# Patient Record
Sex: FEMALE | Race: WHITE | NOT HISPANIC OR LATINO
[De-identification: names, ages, dates, MRNs, and addresses within clinical notes are randomized per-mention and may not be internally consistent; named-entity substitution may affect disease eponyms.]

---

## 2019-08-09 ENCOUNTER — APPOINTMENT (OUTPATIENT)
Dept: ORTHOPEDIC SURGERY | Facility: CLINIC | Age: 74
End: 2019-08-09
Payer: MEDICARE

## 2019-08-09 DIAGNOSIS — Z72.89 OTHER PROBLEMS RELATED TO LIFESTYLE: ICD-10-CM

## 2019-08-09 DIAGNOSIS — Z82.62 FAMILY HISTORY OF OSTEOPOROSIS: ICD-10-CM

## 2019-08-09 DIAGNOSIS — Z78.9 OTHER SPECIFIED HEALTH STATUS: ICD-10-CM

## 2019-08-09 DIAGNOSIS — S83.242A OTHER TEAR OF MEDIAL MENISCUS, CURRENT INJURY, LEFT KNEE, INITIAL ENCOUNTER: ICD-10-CM

## 2019-08-09 DIAGNOSIS — M17.12 UNILATERAL PRIMARY OSTEOARTHRITIS, LEFT KNEE: ICD-10-CM

## 2019-08-09 DIAGNOSIS — S83.282A OTHER TEAR OF LATERAL MENISCUS, CURRENT INJURY, LEFT KNEE, INITIAL ENCOUNTER: ICD-10-CM

## 2019-08-09 DIAGNOSIS — M25.562 PAIN IN LEFT KNEE: ICD-10-CM

## 2019-08-09 DIAGNOSIS — Z82.61 FAMILY HISTORY OF ARTHRITIS: ICD-10-CM

## 2019-08-09 PROCEDURE — 73564 X-RAY EXAM KNEE 4 OR MORE: CPT | Mod: LT

## 2019-08-09 PROCEDURE — 99203 OFFICE O/P NEW LOW 30 MIN: CPT

## 2019-08-09 PROCEDURE — 73560 X-RAY EXAM OF KNEE 1 OR 2: CPT | Mod: RT

## 2019-08-09 NOTE — PHYSICAL EXAM
[de-identified] : General appearance: well nourished and hydrated, pleasant, alert and oriented x 3, cooperative.\par HEENT: Normocephalic, EOM intact, Nasal septum midline, Oral cavity clear, External auditory canal clear.\par Cardiovascular: no apparent abnormalities, no lower leg edema, no varicosities, pedal pulses are palpable.\par Lymphatics Lymph nodes: none palpated, Lymphedema: not present.\par Neurologic: sensation is normal, no muscle weakness in upper or lower extremities, patella tendon reflexes intact .\par Dermatologic no apparent skin lesions, moist, warm, no rash.\par Spine: cervical spine appears normal and moves freely, thoracic spine appears normal and moves freely, lumbosacral spine appears normal and moves freely.\par Gait: nonantalgic.\par \par Left knee\par Inspection: no effusion or erythema.\par Wounds: none.\par Alignment: normal.\par Palpation: medial and lateral tenderness on palpation.\par ROM active (in degrees): 5-135 with patellofemoral crepitus \par Ligamentous laxity: all ligaments appear stable,, negative ant. drawer test, negative post. drawer test, stable to varus stress test, stable to valgus stress test. negative Lachman's test, negative pivot shift test\par Meniscal Test: negative McMurrays, negative Lilliam.\par Patellofemoral Alignment Test: Q angle-, normal.\par Muscle Test: good quad strength.\par Leg examination: calf is soft and non-tender.\par \par Right knee\par Inspection: no effusion or erythema.\par Wounds: none.\par Alignment: normal.\par Palpation: no specific tenderness on palpation.\par ROM active (in degrees): 0-135\par Ligamentous laxity: all ligaments appear stable,, negative ant. drawer test, negative post. drawer test, stable to varus stress test, stable to valgus stress test. negative Lachman's test, negative pivot shift test\par Meniscal Test: negative McMurrays, negative Lilliam.\par Patellofemoral Alignment Test: Q angle-, normal.\par Muscle Test: good quad strength.\par Leg examination: calf is soft and non-tender.\par \par Left hip\par Inspection: No swelling or ecchymosis.\par Wounds: none.\par Palpation: non-tender.\par Stability: no instability.\par Strength: 5/5 all motor groups.\par ROM: no pain with FROM.\par Leg length: equal.\par \par Right hip\par Inspection: No swelling or ecchymosis.\par Wounds: none.\par Palpation: non-tender.\par Stability: no instability.\par Strength: 5/5 all motor groups.\par ROM: no pain with FROM.\par Leg length: equal.\par \par Left ankle\par Inspection: no erythema noted, no swelling noted.\par Palpation: no pain on palpation .\par ROM: FROM without crepitus.\par Muscle strength: 5/5.\par Stability: no instability noted.\par \par Right ankle\par Inspection: no erythema noted, no swelling noted.\par ROM: FROM without crepitus.\par Palpation: no pain on palpation .\par Muscle strength: 5/5.\par Stability: no instability noted.\par \par Left foot\par Inspection: color, texture and turgor are normal.\par ROM: full range of motion of all joints without pain or crepitus.\par Palpation: no tenderness.\par Stability: no instability noted.\par \par Right foot\par Inspection: color, texture and turgor are normal.\par ROM: full range of motion of all joints without pain or crepitus.\par Palpation: no tenderness.\par Stability: no instability noted. [de-identified] : Left knee xrays, standing AP/Lateral, Merchant, and 45 degree PA standing view, taken at the office today shows normal alignment, mild decreased medial and lateral joint height, patella sits at an appropriate height in a central position with mild joint space maintained, Kellgren and Mateusz grade 2 \par \par Right knee xray merchant view taken at the office today shows the patella in a central position with mild joint space narrowing consistent with patellofemoral arthritis  \par \par MRI Left knee taken 1/15/2019: Films brought in and reviewed, see attached report.\par

## 2019-08-09 NOTE — ADDENDUM
[FreeTextEntry1] : This note was written by Talia Ortiz on 08/09/2019 acting as scribe for Dr. Dakota Martin M.D.\par \par I, Dr. Dakota Martin M.D., have read and attest that all the information, medical decision making and discharge instructions within are true and accurate.\par

## 2019-08-09 NOTE — DISCUSSION/SUMMARY
[de-identified] : Discussed at length the nature of the patients condition. Their left knee symptoms appear secondary to degenerative arthritis and torn medial and lateral meniscus which at this point is minimally symptomatic. We reviewed operative and nonoperative treatment. I think she would benefit from nonoperative treatment at this time. I have suggested PT which is medically necessary, as well as Motrin and Tylenol prn. She declined any injections since she did have a cortisone injection a few months ago. They can continue activities as tolerated. Patient may follow up with x-rays in 2-3 months if she has any residual symptoms.

## 2019-08-09 NOTE — HISTORY OF PRESENT ILLNESS
[de-identified] : 73 year old female presents for initial evaluation of left knee pain for the past 6 months. She reports no inciting event, but does state she twisted her left knee a few weeks ago which exacerbated her pain. She locates her pain in the lateral aspect of the joint, which is dull in nature. She endorses locking, clicking, and swelling. She can walk 3-5 blocks without a cane, walker, or brace and negotiates stairs normally. She has been using Tylenol and Motrin prn with limited relief. Patient did see Dr. Caballero, who obtained an MRI of her left knee and sent her to PT which she feels did improve her symptoms.  She also received a cortisone injection, which did provide months of relief. Today, she would like to discuss her treatment options with Dr. Martin as her pain has returned.

## 2022-12-19 ENCOUNTER — APPOINTMENT (OUTPATIENT)
Dept: ORTHOPEDIC SURGERY | Facility: CLINIC | Age: 77
End: 2022-12-19

## 2022-12-19 ENCOUNTER — NON-APPOINTMENT (OUTPATIENT)
Age: 77
End: 2022-12-19

## 2022-12-19 PROCEDURE — 72040 X-RAY EXAM NECK SPINE 2-3 VW: CPT

## 2022-12-19 PROCEDURE — 99204 OFFICE O/P NEW MOD 45 MIN: CPT

## 2022-12-19 PROCEDURE — 73030 X-RAY EXAM OF SHOULDER: CPT | Mod: LT

## 2023-02-02 ENCOUNTER — APPOINTMENT (OUTPATIENT)
Dept: ORTHOPEDIC SURGERY | Facility: CLINIC | Age: 78
End: 2023-02-02
Payer: MEDICARE

## 2023-02-02 DIAGNOSIS — M75.42 IMPINGEMENT SYNDROME OF LEFT SHOULDER: ICD-10-CM

## 2023-02-02 PROCEDURE — 99213 OFFICE O/P EST LOW 20 MIN: CPT

## 2023-10-18 ENCOUNTER — APPOINTMENT (OUTPATIENT)
Dept: ORTHOPEDIC SURGERY | Facility: CLINIC | Age: 78
End: 2023-10-18
Payer: MEDICARE

## 2023-10-18 ENCOUNTER — NON-APPOINTMENT (OUTPATIENT)
Age: 78
End: 2023-10-18

## 2023-10-18 PROCEDURE — 99213 OFFICE O/P EST LOW 20 MIN: CPT | Mod: 25

## 2023-10-18 PROCEDURE — 20550 NJX 1 TENDON SHEATH/LIGAMENT: CPT | Mod: RT

## 2023-10-18 RX ADMIN — LIDOCAINE HYDROCHLORIDE 0.5 %: 10 INJECTION, SOLUTION INFILTRATION; PERINEURAL at 00:00

## 2023-10-18 RX ADMIN — BETAMETHASONE ACETATE AND BETAMETHASONE SODIUM PHOSPHATE 1 MG/ML: 3; 3 INJECTION, SUSPENSION INTRA-ARTICULAR; INTRALESIONAL; INTRAMUSCULAR; SOFT TISSUE at 00:00

## 2023-10-27 RX ORDER — LIDOCAINE HYDROCHLORIDE 10 MG/ML
1 INJECTION, SOLUTION INFILTRATION; PERINEURAL
Refills: 0 | Status: COMPLETED | OUTPATIENT
Start: 2023-10-18

## 2023-10-27 RX ORDER — BETAMETHA AC,SOD PHOS/WATER/PF 6 MG/ML
6 (3-3) VIAL (ML) INJECTION
Qty: 1 | Refills: 0 | Status: COMPLETED | OUTPATIENT
Start: 2023-10-18

## 2023-12-11 ENCOUNTER — APPOINTMENT (OUTPATIENT)
Dept: ORTHOPEDIC SURGERY | Facility: CLINIC | Age: 78
End: 2023-12-11
Payer: MEDICARE

## 2023-12-11 VITALS — BODY MASS INDEX: 23.32 KG/M2 | HEIGHT: 65 IN | WEIGHT: 140 LBS

## 2023-12-11 PROCEDURE — 73030 X-RAY EXAM OF SHOULDER: CPT | Mod: LT

## 2023-12-11 PROCEDURE — 99214 OFFICE O/P EST MOD 30 MIN: CPT

## 2023-12-11 PROCEDURE — 72040 X-RAY EXAM NECK SPINE 2-3 VW: CPT

## 2023-12-30 ENCOUNTER — APPOINTMENT (OUTPATIENT)
Dept: MRI IMAGING | Facility: IMAGING CENTER | Age: 78
End: 2023-12-30
Payer: MEDICARE

## 2023-12-30 ENCOUNTER — OUTPATIENT (OUTPATIENT)
Dept: OUTPATIENT SERVICES | Facility: HOSPITAL | Age: 78
LOS: 1 days | End: 2023-12-30
Payer: MEDICARE

## 2023-12-30 DIAGNOSIS — M19.012 PRIMARY OSTEOARTHRITIS, LEFT SHOULDER: ICD-10-CM

## 2023-12-30 PROCEDURE — 73221 MRI JOINT UPR EXTREM W/O DYE: CPT

## 2023-12-30 PROCEDURE — 73221 MRI JOINT UPR EXTREM W/O DYE: CPT | Mod: 26,LT,MH

## 2024-01-08 ENCOUNTER — NON-APPOINTMENT (OUTPATIENT)
Age: 79
End: 2024-01-08

## 2024-02-22 ENCOUNTER — APPOINTMENT (OUTPATIENT)
Dept: ORTHOPEDIC SURGERY | Facility: CLINIC | Age: 79
End: 2024-02-22
Payer: MEDICARE

## 2024-02-22 PROCEDURE — 20611 DRAIN/INJ JOINT/BURSA W/US: CPT | Mod: LT

## 2024-02-22 PROCEDURE — 99214 OFFICE O/P EST MOD 30 MIN: CPT | Mod: 25

## 2024-02-22 NOTE — PHYSICAL EXAM
[de-identified] : Constitutional o Appearance : well-nourished, well developed, alert, in no acute distress  Head and Face o Head :  Inspection : atraumatic, normocephalic o Face :  Inspection : no visible rash or discoloration Respiratory o Respiratory Effort: breathing unlabored  Neurologic o Sensation : Normal sensation  Psychiatric o Mood and Affect: mood normal, affect appropriate  Lymphatic o Additional Nodes : No palpable lymph nodes present   Cervical Spine o Inspection/Palpation : left and right paracervical tenderness   Inspection : alignment midline, normal degree of lordosis present  Skin : normal appearance, no masses or tenderness, trachea midline  Palpation : left paracervical and no right or left parascapular,  o Range of Motion :  o Tests: Negative Spurling's test   Right Upper Extremity o Right Shoulder :  Inspection/Palpation : no tenderness, no swelling or deformities  Range of Motion : full ROM, no crepitance  Strength : forward elevation 5/5, IR 5/5, ER 5/5, ER at 90 of abduction 5/5, supraspinatus 5/5, adduction 5/5, abduction 5/5, biceps/triceps 5/5,  5/5   Stability : no joint instability on provocative testing   Tests: Bustos negative, Neer test negative, Nataliia test negative, drop arm test negative, Hopkins's test negative   Left Upper Extremity o Left Shoulder :  Inspection/Palpation : anterior capsular  tenderness, mild AC joint tenderness, no swelling or deformities  Range of Motion : full ER with mild discomfort, limited discomfort with IR,  pain with IR, no crepitance  Strength : forward elevation 4-/5, IR 4-/5, ER 4-/5, ER at 90 of abduction 5/5, supraspinatus5/5, adduction 5/5, abduction 5/5, biceps/triceps 5/5,  5/5   Stability : no joint instability on provocative testing  Tests: Bustos neg, Neer test negative, Nataliia test negative, drop arm test negative, Hopkins's test negative, cross chest maneuvers cause discomfort   Gait and Station: Gait: gait normal, no significant extremity swelling or lymphedema, good proprioception and balance  o Shoulder: Indication- left shoulder arthritis, Anatomic location left intra-articular joint, Spray-area was sterilized with Betadine and alcohol and anesthetized with Ethyl Chloride , Needle used - 22G, Medications given- 5cc's lidocaine, 0.5cc's kenalog, 0.5cc's dexamethasone. The patient tolerated the procedure well. This was done under Ultrasound guidance.

## 2024-02-22 NOTE — ADDENDUM
[FreeTextEntry1] : I, ARELIS JOHNSON, acted solely as a scribe for Dr. Yan Bond on this date 02/22/2024.  All medical record entries made by the Scribe were at my, Dr. Yan Bond, direction and personally dictated by me on 02/22/2024. I have reviewed the chart and agree that the record accurately reflects my personal performance of the history, physical exam, assessment and plan. I have also personally directed, reviewed, and agreed with the chart.

## 2024-02-22 NOTE — DISCUSSION/SUMMARY
[de-identified] : I went over the pathophysiology of the patient's symptoms in great detail with the patient. I went over the patient's MRI results with them in great detail and used models to aid in my explanation. The series of 3 Visco supplementation was discussed as a solution to the patient's symptoms and a booklet with information was provided. The patient elected to receive a cortisone injection into her left shoulder today, and tolerated it well. I instructed the patient on ROM exercises, and told them to take it easy. The use of ice and rest was reviewed with the patient. The patient may resume activities tomorrow.   All of their questions were answered. They understand and consent to the plan.   FU in 6 weeks.

## 2024-02-22 NOTE — HISTORY OF PRESENT ILLNESS
[de-identified] : 77-year-old RHD female presents for a left shoulder MRI review today 2/22/2024. I went over the patient's MRI results with them in great detail and used models to aid in my explanation. She states she is a chronic insomniac. She states she cannot find a comfortable position for her left shoulder to sleep in.  Patient denies that she has any numbness or tingling. She states she is also having neck discomfort.   MRI [facility] LEFT SHOULDER revealed: done on 12/30/2023 IMPRESSION: 1.  Severe glenohumeral joint osteoarthritis with large effusion and synovitis.  2.  Moderate tendinosis of supraspinatus and infraspinatus tendons with mildly retracted high-grade partial-thickness tearing of interstitial and bursal sided fibers at the supraspinatus/infraspinatus junction.  --- End of Report ---  Radiology Results 12/11/2023 o Cervical Spine : AP and lateral views were obtained and revealed slight retrolisthesis of C6-7 and diffuse facet arthropathy unchanged from December of 2022  o Left Shoulder : AP internal/external rotation and outlet views were obtained and revealed moderate degenerative arthritis of the AC joint and cystic change of the glenohumeral   Radiology Results from 12/19/2022: o Cervical Spine : A/P and lateral views were obtained and revealed grade 1 spondylolisthesis of C5 on C6 with diffuse facet arthropathy.  o Left Shoulder : AP internal/external rotation and outlet views were obtained and revealed moderate degenerative arthritis of the GH joint with a downsloping acromion and degenerative arthritis of the AC joint. No lytic lesions of the visualized chest or left shoulder.

## 2024-03-19 PROBLEM — M65.331 TRIGGER MIDDLE FINGER OF RIGHT HAND: Status: ACTIVE | Noted: 2023-10-18

## 2024-03-21 ENCOUNTER — APPOINTMENT (OUTPATIENT)
Dept: ORTHOPEDIC SURGERY | Facility: CLINIC | Age: 79
End: 2024-03-21
Payer: MEDICARE

## 2024-03-21 VITALS — BODY MASS INDEX: 23.32 KG/M2 | HEIGHT: 65 IN | WEIGHT: 140 LBS

## 2024-03-21 DIAGNOSIS — M47.812 SPONDYLOSIS W/OUT MYELOPATHY OR RADICULOPATHY, CERVICAL REGION: ICD-10-CM

## 2024-03-21 DIAGNOSIS — M65.331 TRIGGER FINGER, RIGHT MIDDLE FINGER: ICD-10-CM

## 2024-03-21 PROCEDURE — 99214 OFFICE O/P EST MOD 30 MIN: CPT

## 2024-03-21 PROCEDURE — 20550 NJX 1 TENDON SHEATH/LIGAMENT: CPT | Mod: RT

## 2024-03-21 PROCEDURE — 99213 OFFICE O/P EST LOW 20 MIN: CPT | Mod: 25

## 2024-03-21 RX ORDER — BETAMETHA AC,SOD PHOS/WATER/PF 6 MG/ML
6 (3-3) VIAL (ML) INJECTION
Qty: 1 | Refills: 0 | Status: COMPLETED | OUTPATIENT
Start: 2024-03-21

## 2024-03-21 RX ADMIN — LIDOCAINE HYDROCHLORIDE 0.5 %: 10 INJECTION, SOLUTION INFILTRATION; PERINEURAL at 00:00

## 2024-03-21 RX ADMIN — BETAMETHASONE ACETATE AND BETAMETHASONE SODIUM PHOSPHATE MG/ML: 3; 3 INJECTION, SUSPENSION INTRA-ARTICULAR; INTRALESIONAL; INTRAMUSCULAR; SOFT TISSUE at 00:00

## 2024-03-21 NOTE — PROCEDURE
[FreeTextEntry1] : -  After a discussion of risks and benefits, the patient agreed to proceed with a cortisone injection.   -  Side: Right  -  Finger: Middle finger trigger finger -  Medications: 0.5 cc of 1% Lidocaine and 1 cc of Celestone Soluspan, 6mg/cc, using sterile technique. -  Patient tolerated the procedure well, without complications. -  Patient was told that the symptoms may worsen for a day or two, and should then begin to improve.  -  Instructions: Patient was instructed on activity modification for the next several days. -  Follow-up: According to her symptoms.

## 2024-03-21 NOTE — PHYSICAL EXAM
[de-identified] : - Constitutional: This is a female in no obvious distress.  - Psych: Patient is alert and oriented to person, place and time.  Patient has a normal mood and affect. - Cardiovascular: Normal pulses throughout the upper extremities.  No significant varicosities are noted in the upper extremities. - Neuro: Strength and sensation are intact throughout the upper extremities.  Patient has normal coordination. - Respiratory:  Patient exhibits no evidence of shortness of breath or difficulty breathing. - Skin: No rashes, lesions, or other abnormalities are noted in the upper extremities. ---  Examination of her right hand demonstrates swelling and tenderness along the A1 pulley of the middle finger.  There is triggering.  There is no triggering of the other digits.  She is neurovascularly intact distally.

## 2024-03-21 NOTE — HISTORY OF PRESENT ILLNESS
[FreeTextEntry1] : Greater than 5 months status post right middle finger trigger cortisone injection #1.  She returns today, with recurrent pain and triggering.

## 2024-03-21 NOTE — ADDENDUM
[FreeTextEntry1] : I, ARELIS JOHNSON, acted solely as a scribe for Dr. Yan Bond on this date 03/21/2024.  All medical record entries made by the Scribe were at my, Dr. Yan Bond, direction and personally dictated by me on 03/21/2024. I have reviewed the chart and agree that the record accurately reflects my personal performance of the history, physical exam, assessment and plan. I have also personally directed, reviewed, and agreed with the chart.

## 2024-03-21 NOTE — DISCUSSION/SUMMARY
[de-identified] : I went over the pathophysiology of the patient's symptoms in great detail with the patient. Viscosupplementation was discussed as a solution to the patient's symptoms and a booklet with information was provided. She is not eligible for a cortisone injection today. We discussed the use of ice, Tylenol and anti-inflammatories to relieve pain. We discussed the use of Salon Pas and Voltaren gel.   All of their questions were answered. They understand and consent to the plan.   FU when she returns from [place] . We will start left shoulder Euflexxa upon her return.

## 2024-03-21 NOTE — DISCUSSION/SUMMARY
[FreeTextEntry1] : I had a discussion regarding today's visit, the diagnosis and treatment recommendations and options.  We also discussed changes since the last visit.  At this time, I recommended a repeat cortisone injection at her right middle finger trigger finger.  She agreed to proceed.  The patient has agreed to the above plan of management and has expressed full understanding.  All questions were fully answered to the patient's satisfaction.  My cumulative time spent on today's visit included: Preparation for the visit, review of the medical records, review of pertinent diagnostic studies, examination and counseling of the patient on the above diagnosis, treatment plan and prognosis, orders of diagnostic tests, medications and/or appropriate procedures and documentation in the medical records of today's visit.

## 2024-03-21 NOTE — HISTORY OF PRESENT ILLNESS
[de-identified] : 77-year-old RHD female presents for a left shoulder follow-up evaluation today. She had a left shoulder injection on 2/22/2024 and reports immediate relief but felt lightheaded for half an hour but states that dissipated after. She states her left shoulder discomfort is returning. She states she cannot put her left arm behind her back. She states she has some limited ROM. Patient denies that she has any numbness or tingling. She states she is going to [place] next Tuesday.   MRI [facility] LEFT SHOULDER revealed: done on 12/30/2023 IMPRESSION: 1.  Severe glenohumeral joint osteoarthritis with large effusion and synovitis.  2.  Moderate tendinosis of supraspinatus and infraspinatus tendons with mildly retracted high-grade partial-thickness tearing of interstitial and bursal sided fibers at the supraspinatus/infraspinatus junction.  --- End of Report ---  Radiology Results 12/11/2023 o Cervical Spine : AP and lateral views were obtained and revealed slight retrolisthesis of C6-7 and diffuse facet arthropathy unchanged from December of 2022  o Left Shoulder : AP internal/external rotation and outlet views were obtained and revealed moderate degenerative arthritis of the AC joint and cystic change of the glenohumeral   Radiology Results from 12/19/2022: o Cervical Spine : A/P and lateral views were obtained and revealed grade 1 spondylolisthesis of C5 on C6 with diffuse facet arthropathy.  o Left Shoulder : AP internal/external rotation and outlet views were obtained and revealed moderate degenerative arthritis of the GH joint with a downsloping acromion and degenerative arthritis of the AC joint. No lytic lesions of the visualized chest or left shoulder.

## 2024-03-21 NOTE — PHYSICAL EXAM
[de-identified] : Constitutional o Appearance : well-nourished, well developed, alert, in no acute distress  Head and Face o Head :  Inspection : atraumatic, normocephalic o Face :  Inspection : no visible rash or discoloration Respiratory o Respiratory Effort: breathing unlabored  Neurologic o Sensation : Normal sensation  Psychiatric o Mood and Affect: mood normal, affect appropriate  Lymphatic o Additional Nodes : No palpable lymph nodes present   Cervical Spine o Inspection/Palpation : left and right paracervical tenderness   Inspection : alignment midline, normal degree of lordosis present  Skin : normal appearance, no masses or tenderness, trachea midline  Palpation : left paracervical and no right or left parascapular,  o Range of Motion :  o Tests: Negative Spurling's test   Right Upper Extremity o Right Shoulder :  Inspection/Palpation : no tenderness, no swelling or deformities  Range of Motion : full ROM, no crepitance  Strength : forward elevation 5/5, IR 5/5, ER 5/5, ER at 90 of abduction 5/5, supraspinatus 5/5, adduction 5/5, abduction 5/5, biceps/triceps 5/5,  5/5   Stability : no joint instability on provocative testing   Tests: Bustos negative, Neer test negative, Nataliia test negative, drop arm test negative, [facility] test negative   Left Upper Extremity o Left Shoulder :  Inspection/Palpation : no anterior capsular  tenderness, no AC joint tenderness, no swelling or deformities  Range of Motion : discomfort with full forward flexion, limited IR by 3 vertebral levels with pain, full ER,   no crepitance  Strength : forward elevation 5/5, IR 5/5, ER 5/5, ER at 90 of abduction 5/5, supraspinatus5/5, adduction 5/5, abduction 5/5, biceps/triceps 5/5,  5/5   Stability : no joint instability on provocative testing  Tests: Bustos neg, Neer test negative, Nataliia test negative, drop arm test negative, [facility] test negative, cross chest maneuvers cause discomfort   Gait and Station: Gait: gait normal, no significant extremity swelling or lymphedema, good proprioception and balance

## 2024-04-09 ENCOUNTER — APPOINTMENT (OUTPATIENT)
Dept: ORTHOPEDIC SURGERY | Facility: CLINIC | Age: 79
End: 2024-04-09
Payer: MEDICARE

## 2024-04-09 PROCEDURE — 20611 DRAIN/INJ JOINT/BURSA W/US: CPT | Mod: LT

## 2024-04-09 PROCEDURE — 99213 OFFICE O/P EST LOW 20 MIN: CPT | Mod: 25

## 2024-04-09 NOTE — HISTORY OF PRESENT ILLNESS
[de-identified] : 77-year-old RHD female presents for a left shoulder follow-up evaluation today. She had a left shoulder injection on 2/22/2024 and reports immediate relief but felt lightheaded for half an hour but states that dissipated after. She states the left shoulder discomfort is livable. She states she does not think about the left shoulder pain as often. She states her internal rotation of the left shoulder has improved. Patient denies that she has any numbness or tingling. We discussed starting the series of 3 for the left shoulder.     MRI [facility] LEFT SHOULDER revealed: done on 12/30/2023 IMPRESSION: 1.  Severe glenohumeral joint osteoarthritis with large effusion and synovitis.  2.  Moderate tendinosis of supraspinatus and infraspinatus tendons with mildly retracted high-grade partial-thickness tearing of interstitial and bursal sided fibers at the supraspinatus/infraspinatus junction.  --- End of Report ---  Radiology Results 12/11/2023 o Cervical Spine : AP and lateral views were obtained and revealed slight retrolisthesis of C6-7 and diffuse facet arthropathy unchanged from December of 2022  o Left Shoulder : AP internal/external rotation and outlet views were obtained and revealed moderate degenerative arthritis of the AC joint and cystic change of the glenohumeral   Radiology Results from 12/19/2022: o Cervical Spine : A/P and lateral views were obtained and revealed grade 1 spondylolisthesis of C5 on C6 with diffuse facet arthropathy.  o Left Shoulder : AP internal/external rotation and outlet views were obtained and revealed moderate degenerative arthritis of the GH joint with a downsloping acromion and degenerative arthritis of the AC joint. No lytic lesions of the visualized chest or left shoulder.

## 2024-04-09 NOTE — DISCUSSION/SUMMARY
[de-identified] : I went over the pathophysiology of the patient's symptoms in great detail with the patient. The patient elected to receive a Euflexxa injection into her left shoulder today, and tolerated it well. I instructed the patient on ROM exercises, and told them to take it easy. The use of ice and rest was reviewed with the patient. The patient may resume activities tomorrow. I reminded the patient that it takes 4 to 6 weeks after the final injection to feel symptom relief.   All of their questions were answered. They understand and consent to the plan.   FU next week for 2/3 Euflexxa for the left shoulder.

## 2024-04-09 NOTE — ADDENDUM
[FreeTextEntry1] : I, ARELIS JOHNSON, acted solely as a scribe for Dr. Yan Bond on this date 04/09/2024.  All medical record entries made by the Scribe were at my, Dr. Yan Bodn, direction and personally dictated by me on 04/09/2024. I have reviewed the chart and agree that the record accurately reflects my personal performance of the history, physical exam, assessment and plan. I have also personally directed, reviewed, and agreed with the chart.

## 2024-04-09 NOTE — PHYSICAL EXAM
[de-identified] : Constitutional o Appearance : well-nourished, well developed, alert, in no acute distress  Head and Face o Head :  Inspection : atraumatic, normocephalic o Face :  Inspection : no visible rash or discoloration Respiratory o Respiratory Effort: breathing unlabored  Neurologic o Sensation : Normal sensation  Psychiatric o Mood and Affect: mood normal, affect appropriate  Lymphatic o Additional Nodes : No palpable lymph nodes present   Cervical Spine o Inspection/Palpation : left and right paracervical tenderness   Inspection : alignment midline, normal degree of lordosis present  Skin : normal appearance, no masses or tenderness, trachea midline  Palpation : left paracervical and no right or left parascapular,  o Range of Motion :  o Tests: Negative Spurling's test   Right Upper Extremity o Right Shoulder :  Inspection/Palpation : no tenderness, no swelling or deformities  Range of Motion : full ROM, no crepitance  Strength : forward elevation 5/5, IR 5/5, ER 5/5, ER at 90 of abduction 5/5, supraspinatus 5/5, adduction 5/5, abduction 5/5, biceps/triceps 5/5,  5/5   Stability : no joint instability on provocative testing   Tests: Bustos negative, Neer test negative, Nataliai test negative, drop arm test negative, Crittenden's test negative   Left Upper Extremity o Left Shoulder :  Inspection/Palpation : no anterior capsular  tenderness, no AC joint tenderness, no swelling or deformities  Range of Motion : full forward flexion, limited IR by 3 vertebral levels with discomfort, full ER,   no crepitance  Strength : forward elevation 5/5, IR 5/5, ER 5/5, ER at 90 of abduction 5/5, supraspinatus5/5, adduction 5/5, abduction 5/5, biceps/triceps 5/5,  5/5   Stability : no joint instability on provocative testing  Tests: Bustos neg, Neer test negative, Nataliia test negative, drop arm test negative, Crittenden's test negative, cross chest maneuvers cause discomfort   Gait and Station: Gait: gait normal, no significant extremity swelling or lymphedema, good proprioception and balance  o Shoulder injection : Indication- left shoulder osteoarthritis, Anatomic location- left intra-articular joint space, Spray - area was sterilized with Betadine and alcohol and anesthetized with Ethyl Chloride , needle used-20G, Medications given- 2cc's Euflexxa (1/3) under Ultrasound guidance. The patient tolerated the procedure it well. Lot#[id number] Exp#2025-04-22

## 2024-04-18 ENCOUNTER — APPOINTMENT (OUTPATIENT)
Dept: ORTHOPEDIC SURGERY | Facility: CLINIC | Age: 79
End: 2024-04-18
Payer: MEDICARE

## 2024-04-18 VITALS — BODY MASS INDEX: 23.32 KG/M2 | WEIGHT: 140 LBS | HEIGHT: 65 IN

## 2024-04-18 DIAGNOSIS — S46.012D STRAIN OF MUSCLE(S) AND TENDON(S) OF THE ROTATOR CUFF OF LEFT SHOULDER, SUBSEQUENT ENCOUNTER: ICD-10-CM

## 2024-04-18 PROCEDURE — 20611 DRAIN/INJ JOINT/BURSA W/US: CPT | Mod: LT

## 2024-04-18 PROCEDURE — 99213 OFFICE O/P EST LOW 20 MIN: CPT | Mod: 25

## 2024-04-18 NOTE — HISTORY OF PRESENT ILLNESS
[de-identified] : 77-year-old RHD female presents for a left shoulder 2/3 Euflexxa injection on 4/18/2024. Patient denies that she has any numbness or tingling. We informed the patient that it takes 4-6 weeks after the last injection to feel symptom releif. She had a left shoulder cortisone injection on 2/22/2024 and reports immediate relief but felt lightheaded for half an hour but states that dissipated after.   MRI [facility] LEFT SHOULDER revealed: done on 12/30/2023 IMPRESSION: 1.  Severe glenohumeral joint osteoarthritis with large effusion and synovitis.  2.  Moderate tendinosis of supraspinatus and infraspinatus tendons with mildly retracted high-grade partial-thickness tearing of interstitial and bursal sided fibers at the supraspinatus/infraspinatus junction.  --- End of Report ---  Radiology Results 12/11/2023 o Cervical Spine : AP and lateral views were obtained and revealed slight retrolisthesis of C6-7 and diffuse facet arthropathy unchanged from December of 2022  o Left Shoulder : AP internal/external rotation and outlet views were obtained and revealed moderate degenerative arthritis of the AC joint and cystic change of the glenohumeral   Radiology Results from 12/19/2022: o Cervical Spine : A/P and lateral views were obtained and revealed grade 1 spondylolisthesis of C5 on C6 with diffuse facet arthropathy.  o Left Shoulder : AP internal/external rotation and outlet views were obtained and revealed moderate degenerative arthritis of the GH joint with a downsloping acromion and degenerative arthritis of the AC joint. No lytic lesions of the visualized chest or left shoulder.

## 2024-04-18 NOTE — DISCUSSION/SUMMARY
[de-identified] : I went over the pathophysiology of the patient's symptoms in great detail with the patient. The patient elected to receive a Euflexxa injection into her left shoulder today, and tolerated it well. I instructed the patient on ROM exercises, and told them to take it easy. The use of ice and rest was reviewed with the patient. The patient may resume activities tomorrow. I reminded the patient that it takes 4 to 6 weeks after the final injection to feel symptom relief.   All of their questions were answered. They understand and consent to the plan.   FU next week for 3/3 Euflexxa for the left shoulder.

## 2024-04-18 NOTE — PHYSICAL EXAM
[de-identified] : Constitutional o Appearance : well-nourished, well developed, alert, in no acute distress  Head and Face o Head :  Inspection : atraumatic, normocephalic o Face :  Inspection : no visible rash or discoloration Respiratory o Respiratory Effort: breathing unlabored  Neurologic o Sensation : Normal sensation  Psychiatric o Mood and Affect: mood normal, affect appropriate  Lymphatic o Additional Nodes : No palpable lymph nodes present   Cervical Spine o Inspection/Palpation : left and right paracervical tenderness   Inspection : alignment midline, normal degree of lordosis present  Skin : normal appearance, no masses or tenderness, trachea midline  Palpation : left paracervical and no right or left parascapular,  o Range of Motion :  o Tests: Negative Spurling's test   Right Upper Extremity o Right Shoulder :  Inspection/Palpation : no tenderness, no swelling or deformities  Range of Motion : full ROM, no crepitance  Strength : forward elevation 5/5, IR 5/5, ER 5/5, ER at 90 of abduction 5/5, supraspinatus 5/5, adduction 5/5, abduction 5/5, biceps/triceps 5/5,  5/5   Stability : no joint instability on provocative testing   Tests: Bustos negative, Neer test negative, Nataliia test negative, drop arm test negative, Geary's test negative   Left Upper Extremity o Left Shoulder :  Inspection/Palpation : anterior capsular  tenderness, no AC joint tenderness, no swelling or deformities  Range of Motion : full forward flexion, limited IR by 3 vertebral levels with discomfort, full ER,   no crepitance  Strength : forward elevation 5/5, IR 5/5, ER 5/5, ER at 90 of abduction 5/5, supraspinatus5/5, adduction 5/5, abduction 5/5, biceps/triceps 5/5,  5/5   Stability : no joint instability on provocative testing  Tests: Bustos neg, Neer test negative, Nataliia test negative, drop arm test negative, Geary's test negative, cross chest maneuvers cause discomfort   Gait and Station: Gait: gait normal, no significant extremity swelling or lymphedema, good proprioception and balance  o Shoulder injection : Indication- left shoulder osteoarthritis, Anatomic location- left intra-articular joint space, Spray - area was sterilized with Betadine and alcohol and anesthetized with Ethyl Chloride , needle used-20G, Medications given- 2cc's Euflexxa (2/3) under Ultrasound guidance. The patient tolerated the procedure it well. Lot#[id number] Exp#2025-04-22

## 2024-04-18 NOTE — ADDENDUM
[FreeTextEntry1] : I, ARELIS JOHNSON, acted solely as a scribe for Dr. Yan Bond on this date 04/18/2024.  All medical record entries made by the Scribe were at my, Dr. Yan Bond, direction and personally dictated by me on 04/18/2024. I have reviewed the chart and agree that the record accurately reflects my personal performance of the history, physical exam, assessment and plan. I have also personally directed, reviewed, and agreed with the chart.

## 2024-04-18 NOTE — REASON FOR VISIT
[Follow-Up Visit] : a follow-up visit for [Shoulder Pain] : shoulder pain DANITA/ATN requiring CRRT    seen and examined on CRRT.  No edema    - Cont. CRRT, too unstable to transition to intermittent HD  - Replete phos to maintain >3  - Maintain MAP >65  - Remainder per fellow

## 2024-04-25 ENCOUNTER — APPOINTMENT (OUTPATIENT)
Dept: ORTHOPEDIC SURGERY | Facility: CLINIC | Age: 79
End: 2024-04-25
Payer: MEDICARE

## 2024-04-25 VITALS — HEIGHT: 65 IN | BODY MASS INDEX: 23.32 KG/M2 | WEIGHT: 140 LBS

## 2024-04-25 DIAGNOSIS — M19.012 PRIMARY OSTEOARTHRITIS, LEFT SHOULDER: ICD-10-CM

## 2024-04-25 PROCEDURE — 20611 DRAIN/INJ JOINT/BURSA W/US: CPT | Mod: LT

## 2024-04-25 NOTE — PHYSICAL EXAM
[de-identified] : Constitutional o Appearance : well-nourished, well developed, alert, in no acute distress  Head and Face o Head :  Inspection : atraumatic, normocephalic o Face :  Inspection : no visible rash or discoloration Respiratory o Respiratory Effort: breathing unlabored  Neurologic o Sensation : Normal sensation  Psychiatric o Mood and Affect: mood normal, affect appropriate  Lymphatic o Additional Nodes : No palpable lymph nodes present   Cervical Spine o Inspection/Palpation : left and right paracervical tenderness   Inspection : alignment midline, normal degree of lordosis present  Skin : normal appearance, no masses or tenderness, trachea midline  Palpation : left paracervical and no right or left parascapular,  o Range of Motion :  o Tests: Negative Spurling's test   Right Upper Extremity o Right Shoulder :  Inspection/Palpation : no tenderness, no swelling or deformities  Range of Motion : full ROM, no crepitance  Strength : forward elevation 5/5, IR 5/5, ER 5/5, ER at 90 of abduction 5/5, supraspinatus 5/5, adduction 5/5, abduction 5/5, biceps/triceps 5/5,  5/5   Stability : no joint instability on provocative testing   Tests: Bustos negative, Neer test negative, Nataliia test negative, drop arm test negative, Hardin's test negative   Left Upper Extremity o Left Shoulder :  Inspection/Palpation : anterior capsular  tenderness, no AC joint tenderness, no swelling or deformities  Range of Motion : full forward flexion, limited IR by 3 vertebral levels with discomfort, full ER,   no crepitance  Strength : forward elevation 5/5, IR 5/5, ER 5/5, ER at 90 of abduction 5/5, supraspinatus5/5, adduction 5/5, abduction 5/5, biceps/triceps 5/5,  5/5   Stability : no joint instability on provocative testing  Tests: Bustos neg, Neer test negative, Nataliia test negative, drop arm test negative, Hardin's test negative, cross chest maneuvers cause discomfort   Gait and Station: Gait: gait normal, no significant extremity swelling or lymphedema, good proprioception and balance  o Shoulder injection : Indication- left shoulder osteoarthritis, Anatomic location- left intra-articular joint space, Spray - area was sterilized with Betadine and alcohol and anesthetized with Ethyl Chloride , needle used-20G, Medications given- 2cc's Euflexxa (3/3) under Ultrasound guidance. The patient tolerated the procedure it well. Lot#[id number] Exp#2025-04-22

## 2024-04-25 NOTE — ADDENDUM
[FreeTextEntry1] : I, ARELIS JOHNSON, acted solely as a scribe for Dr. Yan Bond on this date 04/25/2024.  All medical record entries made by the Scribe were at my, Dr. Yan Bond, direction and personally dictated by me on 04/25/2024. I have reviewed the chart and agree that the record accurately reflects my personal performance of the history, physical exam, assessment and plan. I have also personally directed, reviewed, and agreed with the chart.

## 2024-04-25 NOTE — DISCUSSION/SUMMARY
[de-identified] : I went over the pathophysiology of the patient's symptoms in great detail with the patient. The patient elected to receive a Euflexxa injection into her left shoulder today, and tolerated it well. I instructed the patient on ROM exercises, and told them to take it easy. The use of ice and rest was reviewed with the patient. The patient may resume activities tomorrow. I reminded the patient that it takes 4 to 6 weeks after the final injection to feel symptom relief.   All of their questions were answered. They understand and consent to the plan.   FU in 6 weeks.

## 2024-04-25 NOTE — HISTORY OF PRESENT ILLNESS
[de-identified] : 77-year-old RHD female presents for a left shoulder 3/3 Euflexxa injection on 4/25/2024. Patient denies that she has any numbness or tingling. We informed the patient that it takes 4-6 weeks after the last injection to feel symptom relief. She states she is getting more ROM of her shoulder. She had a left shoulder cortisone injection on 2/22/2024 and reports immediate relief but felt lightheaded for half an hour but states that dissipated after.   MRI [facility] LEFT SHOULDER revealed: done on 12/30/2023 IMPRESSION: 1.  Severe glenohumeral joint osteoarthritis with large effusion and synovitis.  2.  Moderate tendinosis of supraspinatus and infraspinatus tendons with mildly retracted high-grade partial-thickness tearing of interstitial and bursal sided fibers at the supraspinatus/infraspinatus junction.  --- End of Report ---  Radiology Results 12/11/2023 o Cervical Spine : AP and lateral views were obtained and revealed slight retrolisthesis of C6-7 and diffuse facet arthropathy unchanged from December of 2022  o Left Shoulder : AP internal/external rotation and outlet views were obtained and revealed moderate degenerative arthritis of the AC joint and cystic change of the glenohumeral   Radiology Results from 12/19/2022: o Cervical Spine : A/P and lateral views were obtained and revealed grade 1 spondylolisthesis of C5 on C6 with diffuse facet arthropathy.  o Left Shoulder : AP internal/external rotation and outlet views were obtained and revealed moderate degenerative arthritis of the GH joint with a downsloping acromion and degenerative arthritis of the AC joint. No lytic lesions of the visualized chest or left shoulder.

## 2024-06-11 ENCOUNTER — APPOINTMENT (OUTPATIENT)
Dept: ORTHOPEDIC SURGERY | Facility: CLINIC | Age: 79
End: 2024-06-11

## 2024-08-30 ENCOUNTER — NON-APPOINTMENT (OUTPATIENT)
Age: 79
End: 2024-08-30

## 2024-08-30 NOTE — PHYSICAL EXAM
[de-identified] : - Constitutional: This is a female in no obvious distress.  - Psych: Patient is alert and oriented to person, place and time.  Patient has a normal mood and affect. - Cardiovascular: Normal pulses throughout the upper extremities.  No significant varicosities are noted in the upper extremities. - Neuro: Strength and sensation are intact throughout the upper extremities.  Patient has normal coordination. - Respiratory:  Patient exhibits no evidence of shortness of breath or difficulty breathing. - Skin: No rashes, lesions, or other abnormalities are noted in the upper extremities. ---  Examination of her right hand demonstrates swelling and tenderness along the A1 pulley of the middle finger.  There is triggering.  There is no triggering of the other digits.  She is neurovascularly intact distally.

## 2024-08-30 NOTE — HISTORY OF PRESENT ILLNESS
[FreeTextEntry1] : 5-1/2 months status post right middle finger trigger cortisone injection #2.  She returns today,

## 2024-09-10 ENCOUNTER — APPOINTMENT (OUTPATIENT)
Dept: ORTHOPEDIC SURGERY | Facility: CLINIC | Age: 79
End: 2024-09-10

## 2024-09-10 DIAGNOSIS — M65.331 TRIGGER FINGER, RIGHT MIDDLE FINGER: ICD-10-CM

## 2024-09-10 PROCEDURE — 20550 NJX 1 TENDON SHEATH/LIGAMENT: CPT | Mod: RT

## 2024-09-10 PROCEDURE — 99213 OFFICE O/P EST LOW 20 MIN: CPT | Mod: 25

## 2024-09-10 NOTE — ADDENDUM
[FreeTextEntry1] :  I, Kareem Reid, acted solely as a scribe for Dr. Ang on this date on 09/10/2024.

## 2024-09-10 NOTE — PHYSICAL EXAM
[de-identified] : - Constitutional: This is a female in no obvious distress.  - Psych: Patient is alert and oriented to person, place and time.  Patient has a normal mood and affect. - Cardiovascular: Normal pulses throughout the upper extremities.  No significant varicosities are noted in the upper extremities.  ---  Examination of her right hand demonstrates swelling and tenderness along the A1 pulley of the middle finger.  There is triggering.  There is no triggering of the other digits.  She is neurovascularly intact distally.

## 2024-09-10 NOTE — PHYSICAL EXAM
[de-identified] : - Constitutional: This is a female in no obvious distress.  - Psych: Patient is alert and oriented to person, place and time.  Patient has a normal mood and affect. - Cardiovascular: Normal pulses throughout the upper extremities.  No significant varicosities are noted in the upper extremities.  ---  Examination of her right hand demonstrates swelling and tenderness along the A1 pulley of the middle finger.  There is triggering.  There is no triggering of the other digits.  She is neurovascularly intact distally.

## 2024-09-10 NOTE — DISCUSSION/SUMMARY
[FreeTextEntry1] : I had a discussion regarding today's visit, the diagnosis and treatment recommendations and options.  We also discussed changes since the last visit.  At this time, I discussed treatment options with her including a third cortisone injection or trigger release surgery. She opted to proceed with a third cortisone injection at the right middle finger trigger finger today. If her injection does not provide her symptoms with long-term relief, she would like to have surgery at some point in the future.  I did tell her that if this does not provide with long-term relief, then she cannot have surgery for 2 months.  She states that she is in the process of moving so would not be able to do the surgery for at least 2 months.  She will follow-up according to her symptoms.  The patient has agreed to the above plan of management and has expressed full understanding.  All questions were fully answered to the patient's satisfaction.  My cumulative time spent on today's visit included: Preparation for the visit, review of the medical records, review of pertinent diagnostic studies, examination and counseling of the patient on the above diagnosis, treatment plan and prognosis, orders of diagnostic tests, medications and/or appropriate procedures and documentation in the medical records of today's visit.

## 2024-09-10 NOTE — END OF VISIT
[FreeTextEntry3] : This note was written by Kareem Reid on 09/10/2024 acting solely as a scribe for Dr. Garland Ang.   All medical record entries made by the Scribe were at my, Dr. Garland Ang, direction and personally dictated by me on 09/10/2024. I have personally reviewed the chart and agree that the record accurately reflects my personal performance of the history, physical exam, assessment and plan.

## 2024-09-10 NOTE — HISTORY OF PRESENT ILLNESS
[FreeTextEntry1] : 5-1/2 months status post right middle finger trigger cortisone injection #2.  She returns today for right middle finger trigger finger symptoms. She reports locking and swelling. She rates her pain as a 7/10.

## 2024-09-10 NOTE — PROCEDURE
[FreeTextEntry1] :  - After discussion of risks and benefits, the patient agreed to proceed with a cortisone injection. - Side: right - Finger: middle finger trigger finger - Medications: 0.5cc of 1% Lidocaine and 1cc of Celestone Solupan, 6mg/cc, using sterile technique. - Patient tolerated the procedure well without complications. - Patient was told that the symptoms may worsen for a day or two, and should then begin to improve. - Instructions: Patient was instructed on activity modification for the next several days. - Follow-up: According to her symptoms

## 2024-11-17 ENCOUNTER — NON-APPOINTMENT (OUTPATIENT)
Age: 79
End: 2024-11-17

## 2024-12-24 PROBLEM — F10.90 ALCOHOL USE: Status: ACTIVE | Noted: 2019-08-09

## 2025-01-10 ENCOUNTER — APPOINTMENT (OUTPATIENT)
Dept: ORTHOPEDIC SURGERY | Facility: CLINIC | Age: 80
End: 2025-01-10
Payer: MEDICARE

## 2025-01-10 DIAGNOSIS — M65.331 TRIGGER FINGER, RIGHT MIDDLE FINGER: ICD-10-CM

## 2025-01-10 PROCEDURE — 99214 OFFICE O/P EST MOD 30 MIN: CPT

## 2025-01-15 ENCOUNTER — NON-APPOINTMENT (OUTPATIENT)
Age: 80
End: 2025-01-15

## 2025-01-27 ENCOUNTER — OUTPATIENT (OUTPATIENT)
Dept: OUTPATIENT SERVICES | Facility: HOSPITAL | Age: 80
LOS: 1 days | End: 2025-01-27
Payer: MEDICARE

## 2025-01-27 VITALS
HEART RATE: 73 BPM | OXYGEN SATURATION: 99 % | SYSTOLIC BLOOD PRESSURE: 130 MMHG | DIASTOLIC BLOOD PRESSURE: 80 MMHG | RESPIRATION RATE: 14 BRPM | TEMPERATURE: 97 F | HEIGHT: 66 IN | WEIGHT: 130.07 LBS

## 2025-01-27 DIAGNOSIS — M65.331 TRIGGER FINGER, RIGHT MIDDLE FINGER: ICD-10-CM

## 2025-01-27 DIAGNOSIS — Z98.41 CATARACT EXTRACTION STATUS, RIGHT EYE: Chronic | ICD-10-CM

## 2025-01-27 DIAGNOSIS — Z01.818 ENCOUNTER FOR OTHER PREPROCEDURAL EXAMINATION: ICD-10-CM

## 2025-01-27 DIAGNOSIS — M65.30 TRIGGER FINGER, UNSPECIFIED FINGER: ICD-10-CM

## 2025-01-27 LAB
ANION GAP SERPL CALC-SCNC: 10 MMOL/L — SIGNIFICANT CHANGE UP (ref 5–17)
BUN SERPL-MCNC: 17 MG/DL — SIGNIFICANT CHANGE UP (ref 7–23)
CALCIUM SERPL-MCNC: 9.1 MG/DL — SIGNIFICANT CHANGE UP (ref 8.4–10.5)
CHLORIDE SERPL-SCNC: 105 MMOL/L — SIGNIFICANT CHANGE UP (ref 96–108)
CO2 SERPL-SCNC: 26 MMOL/L — SIGNIFICANT CHANGE UP (ref 22–31)
CREAT SERPL-MCNC: 0.69 MG/DL — SIGNIFICANT CHANGE UP (ref 0.5–1.3)
EGFR: 88 ML/MIN/1.73M2 — SIGNIFICANT CHANGE UP
GLUCOSE SERPL-MCNC: 111 MG/DL — HIGH (ref 70–99)
HCT VFR BLD CALC: 41.5 % — SIGNIFICANT CHANGE UP (ref 34.5–45)
HGB BLD-MCNC: 13.4 G/DL — SIGNIFICANT CHANGE UP (ref 11.5–15.5)
MCHC RBC-ENTMCNC: 29.8 PG — SIGNIFICANT CHANGE UP (ref 27–34)
MCHC RBC-ENTMCNC: 32.3 G/DL — SIGNIFICANT CHANGE UP (ref 32–36)
MCV RBC AUTO: 92.4 FL — SIGNIFICANT CHANGE UP (ref 80–100)
NRBC # BLD: 0 /100 WBCS — SIGNIFICANT CHANGE UP (ref 0–0)
NRBC BLD-RTO: 0 /100 WBCS — SIGNIFICANT CHANGE UP (ref 0–0)
PLATELET # BLD AUTO: 198 K/UL — SIGNIFICANT CHANGE UP (ref 150–400)
POTASSIUM SERPL-MCNC: 4.2 MMOL/L — SIGNIFICANT CHANGE UP (ref 3.5–5.3)
POTASSIUM SERPL-SCNC: 4.2 MMOL/L — SIGNIFICANT CHANGE UP (ref 3.5–5.3)
RBC # BLD: 4.49 M/UL — SIGNIFICANT CHANGE UP (ref 3.8–5.2)
RBC # FLD: 13.2 % — SIGNIFICANT CHANGE UP (ref 10.3–14.5)
SODIUM SERPL-SCNC: 141 MMOL/L — SIGNIFICANT CHANGE UP (ref 135–145)
WBC # BLD: 5.21 K/UL — SIGNIFICANT CHANGE UP (ref 3.8–10.5)
WBC # FLD AUTO: 5.21 K/UL — SIGNIFICANT CHANGE UP (ref 3.8–10.5)

## 2025-01-27 PROCEDURE — 85027 COMPLETE CBC AUTOMATED: CPT

## 2025-01-27 PROCEDURE — 80048 BASIC METABOLIC PNL TOTAL CA: CPT

## 2025-01-27 PROCEDURE — 93005 ELECTROCARDIOGRAM TRACING: CPT

## 2025-01-27 PROCEDURE — 36415 COLL VENOUS BLD VENIPUNCTURE: CPT

## 2025-01-27 PROCEDURE — 93010 ELECTROCARDIOGRAM REPORT: CPT

## 2025-01-27 PROCEDURE — G0463: CPT

## 2025-01-27 RX ORDER — ZOLPIDEM TARTRATE 5 MG/1
1 TABLET, COATED ORAL
Refills: 0 | DISCHARGE

## 2025-01-27 RX ORDER — ROSUVASTATIN CALCIUM 10 MG/1
1 TABLET, FILM COATED ORAL
Refills: 0 | DISCHARGE

## 2025-01-27 NOTE — H&P PST ADULT - MUSCULOSKELETAL
decreased ROM/decreased ROM due to pain details… decreased ROM/decreased ROM due to pain/decreased strength

## 2025-01-27 NOTE — H&P PST ADULT - PROBLEM SELECTOR PLAN 1
scheduled for right middle finger trigger release   will obtain medical clearance   pre op instructions on wash scheduled for right middle finger trigger release on 2/13/25  will obtain medical clearance   pre op instructions on wash and medications

## 2025-01-27 NOTE — H&P PST ADULT - PSYCHIATRIC
Message also sent to patient via [employer] with appointment reminder.   normal affect/alert and oriented x3 negative

## 2025-01-27 NOTE — H&P PST ADULT - NSANTHOSAYNRD_GEN_A_CORE
No. WILEY screening performed.  STOP BANG Legend: 0-2 = LOW Risk; 3-4 = INTERMEDIATE Risk; 5-8 = HIGH Risk

## 2025-01-27 NOTE — H&P PST ADULT - HISTORY OF PRESENT ILLNESS
80 y/o female who presents with complaiunt of pain and locking of right n=migddle finger  80 y/o female who presents with complaint of pain and locking of right middle finger . scheduled for right middle finger trigger release on 2/13/25

## 2025-01-27 NOTE — H&P PST ADULT - NSICDXFAMILYHX_GEN_ALL_CORE_FT
FAMILY HISTORY:  FH: type 2 diabetes    Father  Still living? No  FH: heart disease, Age at diagnosis: Age Unknown    Sibling  Still living? Yes, Estimated age: Age Unknown  FH: prostate cancer, Age at diagnosis: Age Unknown

## 2025-01-27 NOTE — H&P PST ADULT - NSICDXPROCEDURE_GEN_ALL_CORE_FT
PROCEDURES:  Release of trigger finger of right middle finger 27-Jan-2025 09:28:00  Nimisha Alfaro

## 2025-01-27 NOTE — H&P PST ADULT - NSICDXPASTMEDICALHX_GEN_ALL_CORE_FT
PAST MEDICAL HISTORY:  HLD (hyperlipidemia)     Insomnia     Trigger finger, right middle finger

## 2025-02-03 ENCOUNTER — NON-APPOINTMENT (OUTPATIENT)
Age: 80
End: 2025-02-03

## 2025-02-13 ENCOUNTER — APPOINTMENT (OUTPATIENT)
Dept: ORTHOPEDIC SURGERY | Facility: HOSPITAL | Age: 80
End: 2025-02-13

## 2025-03-18 ENCOUNTER — NON-APPOINTMENT (OUTPATIENT)
Age: 80
End: 2025-03-18

## 2025-03-23 ENCOUNTER — NON-APPOINTMENT (OUTPATIENT)
Age: 80
End: 2025-03-23

## 2025-04-11 ENCOUNTER — NON-APPOINTMENT (OUTPATIENT)
Age: 80
End: 2025-04-11

## 2025-04-16 PROBLEM — G47.00 INSOMNIA, UNSPECIFIED: Chronic | Status: ACTIVE | Noted: 2025-01-27

## 2025-04-16 PROBLEM — M65.331 TRIGGER FINGER, RIGHT MIDDLE FINGER: Chronic | Status: ACTIVE | Noted: 2025-01-27

## 2025-04-16 PROBLEM — E78.5 HYPERLIPIDEMIA, UNSPECIFIED: Chronic | Status: ACTIVE | Noted: 2025-01-27

## 2025-04-18 ENCOUNTER — APPOINTMENT (OUTPATIENT)
Dept: ORTHOPEDIC SURGERY | Facility: CLINIC | Age: 80
End: 2025-04-18
Payer: MEDICARE

## 2025-04-18 ENCOUNTER — NON-APPOINTMENT (OUTPATIENT)
Age: 80
End: 2025-04-18

## 2025-04-18 DIAGNOSIS — M65.331 TRIGGER FINGER, RIGHT MIDDLE FINGER: ICD-10-CM

## 2025-04-18 PROCEDURE — 99214 OFFICE O/P EST MOD 30 MIN: CPT

## 2025-04-30 ENCOUNTER — OUTPATIENT (OUTPATIENT)
Dept: OUTPATIENT SERVICES | Facility: HOSPITAL | Age: 80
LOS: 1 days | End: 2025-04-30
Payer: MEDICARE

## 2025-04-30 VITALS
OXYGEN SATURATION: 99 % | HEIGHT: 64.5 IN | TEMPERATURE: 98 F | HEART RATE: 74 BPM | DIASTOLIC BLOOD PRESSURE: 70 MMHG | SYSTOLIC BLOOD PRESSURE: 127 MMHG | WEIGHT: 126.1 LBS | RESPIRATION RATE: 14 BRPM

## 2025-04-30 DIAGNOSIS — Z01.818 ENCOUNTER FOR OTHER PREPROCEDURAL EXAMINATION: ICD-10-CM

## 2025-04-30 DIAGNOSIS — M65.331 TRIGGER FINGER, RIGHT MIDDLE FINGER: ICD-10-CM

## 2025-04-30 DIAGNOSIS — Z98.41 CATARACT EXTRACTION STATUS, RIGHT EYE: Chronic | ICD-10-CM

## 2025-04-30 LAB
ANION GAP SERPL CALC-SCNC: 6 MMOL/L — SIGNIFICANT CHANGE UP (ref 5–17)
BUN SERPL-MCNC: 19 MG/DL — SIGNIFICANT CHANGE UP (ref 7–23)
CALCIUM SERPL-MCNC: 9.5 MG/DL — SIGNIFICANT CHANGE UP (ref 8.4–10.5)
CHLORIDE SERPL-SCNC: 105 MMOL/L — SIGNIFICANT CHANGE UP (ref 96–108)
CO2 SERPL-SCNC: 30 MMOL/L — SIGNIFICANT CHANGE UP (ref 22–31)
CREAT SERPL-MCNC: 0.77 MG/DL — SIGNIFICANT CHANGE UP (ref 0.5–1.3)
EGFR: 78 ML/MIN/1.73M2 — SIGNIFICANT CHANGE UP
EGFR: 78 ML/MIN/1.73M2 — SIGNIFICANT CHANGE UP
GLUCOSE SERPL-MCNC: 93 MG/DL — SIGNIFICANT CHANGE UP (ref 70–99)
HCT VFR BLD CALC: 40.8 % — SIGNIFICANT CHANGE UP (ref 34.5–45)
HGB BLD-MCNC: 12.9 G/DL — SIGNIFICANT CHANGE UP (ref 11.5–15.5)
MCHC RBC-ENTMCNC: 29.8 PG — SIGNIFICANT CHANGE UP (ref 27–34)
MCHC RBC-ENTMCNC: 31.6 G/DL — LOW (ref 32–36)
MCV RBC AUTO: 94.2 FL — SIGNIFICANT CHANGE UP (ref 80–100)
NRBC BLD AUTO-RTO: 0 /100 WBCS — SIGNIFICANT CHANGE UP (ref 0–0)
PLATELET # BLD AUTO: 191 K/UL — SIGNIFICANT CHANGE UP (ref 150–400)
POTASSIUM SERPL-MCNC: 3.9 MMOL/L — SIGNIFICANT CHANGE UP (ref 3.5–5.3)
POTASSIUM SERPL-SCNC: 3.9 MMOL/L — SIGNIFICANT CHANGE UP (ref 3.5–5.3)
RBC # BLD: 4.33 M/UL — SIGNIFICANT CHANGE UP (ref 3.8–5.2)
RBC # FLD: 13.4 % — SIGNIFICANT CHANGE UP (ref 10.3–14.5)
SODIUM SERPL-SCNC: 141 MMOL/L — SIGNIFICANT CHANGE UP (ref 135–145)
WBC # BLD: 5.29 K/UL — SIGNIFICANT CHANGE UP (ref 3.8–10.5)
WBC # FLD AUTO: 5.29 K/UL — SIGNIFICANT CHANGE UP (ref 3.8–10.5)

## 2025-04-30 PROCEDURE — 85027 COMPLETE CBC AUTOMATED: CPT

## 2025-04-30 PROCEDURE — 36415 COLL VENOUS BLD VENIPUNCTURE: CPT

## 2025-04-30 PROCEDURE — 80048 BASIC METABOLIC PNL TOTAL CA: CPT

## 2025-04-30 PROCEDURE — G0463: CPT

## 2025-04-30 NOTE — H&P PST ADULT - MUSCULOSKELETAL COMMENTS
Right middle r finger , tenderness on palpation . thickness noted on palmar fascia right middle finger Right middle  finger; triggering tenderness on palpation . thickness noted on palmar fascia right middle finger

## 2025-04-30 NOTE — H&P PST ADULT - HISTORY OF PRESENT ILLNESS
80 y/o female who presents with complaint of pain and locking of right middle finger . scheduled for right middle finger trigger release on5/20/25

## 2025-04-30 NOTE — H&P PST ADULT - NSICDXPROCEDURE_GEN_ALL_CORE_FT
PROCEDURES:  Release of trigger finger of right middle finger 30-Apr-2025 08:15:08  Nimisha Alfaro

## 2025-04-30 NOTE — H&P PST ADULT - PROBLEM SELECTOR PLAN 1
scheduled for right middle finger trigger release on 2/13/25  will obtain medical clearance   pre op instructions on wash and medications scheduled for right middle finger trigger release on 5/20/25  will obtain medical clearance   pre op instructions on wash and medications

## 2025-05-09 ENCOUNTER — NON-APPOINTMENT (OUTPATIENT)
Age: 80
End: 2025-05-09

## 2025-05-14 RX ORDER — CEFAZOLIN SODIUM IN 0.9 % NACL 3 G/100 ML
2000 INTRAVENOUS SOLUTION, PIGGYBACK (ML) INTRAVENOUS ONCE
Refills: 0 | Status: COMPLETED | OUTPATIENT
Start: 2025-05-20 | End: 2025-05-20

## 2025-05-20 ENCOUNTER — OUTPATIENT (OUTPATIENT)
Dept: OUTPATIENT SERVICES | Facility: HOSPITAL | Age: 80
LOS: 1 days | End: 2025-05-20
Payer: MEDICARE

## 2025-05-20 ENCOUNTER — TRANSCRIPTION ENCOUNTER (OUTPATIENT)
Age: 80
End: 2025-05-20

## 2025-05-20 ENCOUNTER — NON-APPOINTMENT (OUTPATIENT)
Age: 80
End: 2025-05-20

## 2025-05-20 ENCOUNTER — APPOINTMENT (OUTPATIENT)
Dept: ORTHOPEDIC SURGERY | Facility: HOSPITAL | Age: 80
End: 2025-05-20

## 2025-05-20 VITALS
OXYGEN SATURATION: 100 % | SYSTOLIC BLOOD PRESSURE: 112 MMHG | DIASTOLIC BLOOD PRESSURE: 54 MMHG | RESPIRATION RATE: 17 BRPM | TEMPERATURE: 98 F | HEART RATE: 52 BPM

## 2025-05-20 VITALS
OXYGEN SATURATION: 100 % | SYSTOLIC BLOOD PRESSURE: 144 MMHG | DIASTOLIC BLOOD PRESSURE: 69 MMHG | RESPIRATION RATE: 16 BRPM | TEMPERATURE: 98 F | HEART RATE: 64 BPM | HEIGHT: 64 IN | WEIGHT: 121.47 LBS

## 2025-05-20 DIAGNOSIS — Z01.818 ENCOUNTER FOR OTHER PREPROCEDURAL EXAMINATION: ICD-10-CM

## 2025-05-20 DIAGNOSIS — M65.331 TRIGGER FINGER, RIGHT MIDDLE FINGER: ICD-10-CM

## 2025-05-20 DIAGNOSIS — Z98.41 CATARACT EXTRACTION STATUS, RIGHT EYE: Chronic | ICD-10-CM

## 2025-05-20 PROCEDURE — 26055 INCISE FINGER TENDON SHEATH: CPT | Mod: F7

## 2025-05-20 PROCEDURE — ZZZZZ: CPT

## 2025-05-20 RX ORDER — APREPITANT 40 MG/1
40 CAPSULE ORAL ONCE
Refills: 0 | Status: COMPLETED | OUTPATIENT
Start: 2025-05-20 | End: 2025-05-20

## 2025-05-20 RX ORDER — OXYCODONE HYDROCHLORIDE AND ACETAMINOPHEN 10; 325 MG/1; MG/1
1 TABLET ORAL ONCE
Refills: 0 | Status: DISCONTINUED | OUTPATIENT
Start: 2025-05-20 | End: 2025-05-20

## 2025-05-20 RX ORDER — HYDROMORPHONE/SOD CHLOR,ISO/PF 2 MG/10 ML
0.5 SYRINGE (ML) INJECTION
Refills: 0 | Status: DISCONTINUED | OUTPATIENT
Start: 2025-05-20 | End: 2025-05-20

## 2025-05-20 RX ORDER — IBUPROFEN 200 MG
1 TABLET ORAL
Qty: 10 | Refills: 0
Start: 2025-05-20

## 2025-05-20 RX ORDER — SODIUM CHLORIDE 9 G/1000ML
1000 INJECTION, SOLUTION INTRAVENOUS
Refills: 0 | Status: DISCONTINUED | OUTPATIENT
Start: 2025-05-20 | End: 2025-05-20

## 2025-05-20 RX ORDER — ONDANSETRON HCL/PF 4 MG/2 ML
4 VIAL (ML) INJECTION ONCE
Refills: 0 | Status: DISCONTINUED | OUTPATIENT
Start: 2025-05-20 | End: 2025-05-20

## 2025-05-20 RX ORDER — HYDROMORPHONE/SOD CHLOR,ISO/PF 2 MG/10 ML
0.2 SYRINGE (ML) INJECTION
Refills: 0 | Status: DISCONTINUED | OUTPATIENT
Start: 2025-05-20 | End: 2025-05-20

## 2025-05-20 RX ADMIN — APREPITANT 40 MILLIGRAM(S): 40 CAPSULE ORAL at 08:16

## 2025-05-20 RX ADMIN — SODIUM CHLORIDE 75 MILLILITER(S): 9 INJECTION, SOLUTION INTRAVENOUS at 09:49

## 2025-05-20 RX ADMIN — Medication 1 APPLICATION(S): at 08:16

## 2025-05-20 NOTE — ASU DISCHARGE PLAN (ADULT/PEDIATRIC) - CALL YOUR DOCTOR IF YOU HAVE ANY OF THE FOLLOWING:
Bleeding that does not stop/Pain not relieved by Medications/Fever greater than (need to indicate Fahrenheit or Celsius)/Wound/Surgical Site with redness, or foul smelling discharge or pus/Numbness, tingling, color or temperature change to extremity Bleeding that does not stop/Swelling that gets worse/Pain not relieved by Medications/Fever greater than (need to indicate Fahrenheit or Celsius)/Wound/Surgical Site with redness, or foul smelling discharge or pus/Numbness, tingling, color or temperature change to extremity/Nausea and vomiting that does not stop/Unable to urinate/Increased irritability or sluggishness

## 2025-05-20 NOTE — ASU DISCHARGE PLAN (ADULT/PEDIATRIC) - FINANCIAL ASSISTANCE
[facility] provides services at a reduced cost to those who are determined to be eligible through [facility]’s financial assistance program. Information regarding [facility]’s financial assistance program can be found by going to [email or web address] or by calling [phone].

## 2025-05-20 NOTE — ASU DISCHARGE PLAN (ADULT/PEDIATRIC) - CARE PROVIDER_API CALL
Garland Ang)  Surgery of the Hand  [address], [address]  [place] [address]  Phone: [phone]  Fax: [phone]  Follow Up Time:

## 2025-05-20 NOTE — ASU DISCHARGE PLAN (ADULT/PEDIATRIC) - ASU DC SPECIAL INSTRUCTIONSFT
Call MD for severe pain/fever/chills.    Keep dressing clean and dry, cover with waterproof bag and tape while showering. Call MD for severe pain/fever/chills.    Keep dressing clean and dry, cover with waterproof bag and tape while showering.    You may take extra strength tylenol as needed for pain, ibuprofen has been sent to your pharmacy as well. Call MD for severe pain/fever/chills.    Keep dressing clean and dry, cover with waterproof bag and tape while showering.    You may take extra strength Tylenol as needed for pain, ibuprofen has been sent to your pharmacy as well.

## 2025-05-20 NOTE — ASU DISCHARGE PLAN (ADULT/PEDIATRIC) - NS MD DC FALL RISK RISK
For information on Fall & Injury Prevention, visit: [email or web address] OR  [email or web address] OR  https://www.cdc.gov/steadi/patient.html

## 2025-05-23 ENCOUNTER — APPOINTMENT (OUTPATIENT)
Dept: ORTHOPEDIC SURGERY | Facility: CLINIC | Age: 80
End: 2025-05-23
Payer: MEDICARE

## 2025-05-23 PROCEDURE — 99024 POSTOP FOLLOW-UP VISIT: CPT

## 2025-05-30 ENCOUNTER — NON-APPOINTMENT (OUTPATIENT)
Age: 80
End: 2025-05-30

## 2025-05-30 ENCOUNTER — APPOINTMENT (OUTPATIENT)
Dept: ORTHOPEDIC SURGERY | Facility: CLINIC | Age: 80
End: 2025-05-30
Payer: MEDICARE

## 2025-05-30 PROCEDURE — 99024 POSTOP FOLLOW-UP VISIT: CPT

## 2025-06-02 ENCOUNTER — NON-APPOINTMENT (OUTPATIENT)
Age: 80
End: 2025-06-02

## 2025-06-03 ENCOUNTER — NON-APPOINTMENT (OUTPATIENT)
Age: 80
End: 2025-06-03

## 2025-06-04 ENCOUNTER — NON-APPOINTMENT (OUTPATIENT)
Age: 80
End: 2025-06-04

## 2025-06-08 ENCOUNTER — NON-APPOINTMENT (OUTPATIENT)
Age: 80
End: 2025-06-08

## 2025-06-13 ENCOUNTER — APPOINTMENT (OUTPATIENT)
Dept: ORTHOPEDIC SURGERY | Facility: CLINIC | Age: 80
End: 2025-06-13
Payer: MEDICARE

## 2025-06-13 PROCEDURE — 99024 POSTOP FOLLOW-UP VISIT: CPT

## 2025-07-02 ENCOUNTER — NON-APPOINTMENT (OUTPATIENT)
Age: 80
End: 2025-07-02

## 2025-07-16 ENCOUNTER — APPOINTMENT (OUTPATIENT)
Dept: ORTHOPEDIC SURGERY | Facility: CLINIC | Age: 80
End: 2025-07-16
Payer: MEDICARE

## 2025-07-16 PROCEDURE — 99024 POSTOP FOLLOW-UP VISIT: CPT

## 2025-08-04 ENCOUNTER — NON-APPOINTMENT (OUTPATIENT)
Age: 80
End: 2025-08-04

## 2025-08-12 ENCOUNTER — NON-APPOINTMENT (OUTPATIENT)
Age: 80
End: 2025-08-12

## 2025-08-14 ENCOUNTER — APPOINTMENT (OUTPATIENT)
Dept: ORTHOPEDIC SURGERY | Facility: CLINIC | Age: 80
End: 2025-08-14

## 2025-08-14 DIAGNOSIS — M47.812 SPONDYLOSIS W/OUT MYELOPATHY OR RADICULOPATHY, CERVICAL REGION: ICD-10-CM

## 2025-08-18 ENCOUNTER — APPOINTMENT (OUTPATIENT)
Dept: ORTHOPEDIC SURGERY | Facility: CLINIC | Age: 80
End: 2025-08-18

## 2025-08-18 DIAGNOSIS — M19.012 PRIMARY OSTEOARTHRITIS, LEFT SHOULDER: ICD-10-CM

## 2025-08-18 DIAGNOSIS — M12.811 OTHER SPECIFIC ARTHROPATHIES, NOT ELSEWHERE CLASSIFIED, RIGHT SHOULDER: ICD-10-CM

## 2025-08-18 DIAGNOSIS — M19.011 PRIMARY OSTEOARTHRITIS, RIGHT SHOULDER: ICD-10-CM

## 2025-08-30 ENCOUNTER — OUTPATIENT (OUTPATIENT)
Dept: OUTPATIENT SERVICES | Facility: HOSPITAL | Age: 80
LOS: 1 days | End: 2025-08-30

## 2025-08-30 ENCOUNTER — APPOINTMENT (OUTPATIENT)
Dept: MRI IMAGING | Facility: IMAGING CENTER | Age: 80
End: 2025-08-30

## 2025-08-30 ENCOUNTER — OUTPATIENT (OUTPATIENT)
Dept: OUTPATIENT SERVICES | Facility: HOSPITAL | Age: 80
LOS: 1 days | End: 2025-08-30
Payer: MEDICARE

## 2025-08-30 DIAGNOSIS — Z98.41 CATARACT EXTRACTION STATUS, RIGHT EYE: Chronic | ICD-10-CM

## 2025-08-30 DIAGNOSIS — M19.011 PRIMARY OSTEOARTHRITIS, RIGHT SHOULDER: ICD-10-CM

## 2025-08-30 PROCEDURE — 73221 MRI JOINT UPR EXTREM W/O DYE: CPT

## 2025-08-30 PROCEDURE — 73221 MRI JOINT UPR EXTREM W/O DYE: CPT | Mod: 26,RT

## 2025-09-08 ENCOUNTER — NON-APPOINTMENT (OUTPATIENT)
Age: 80
End: 2025-09-08

## 2025-09-17 ENCOUNTER — APPOINTMENT (OUTPATIENT)
Dept: ORTHOPEDIC SURGERY | Facility: CLINIC | Age: 80
End: 2025-09-17
Payer: MEDICARE

## 2025-09-17 DIAGNOSIS — M65.331 TRIGGER FINGER, RIGHT MIDDLE FINGER: ICD-10-CM

## 2025-09-17 PROCEDURE — 99213 OFFICE O/P EST LOW 20 MIN: CPT

## 2025-09-18 ENCOUNTER — APPOINTMENT (OUTPATIENT)
Dept: ORTHOPEDIC SURGERY | Facility: CLINIC | Age: 80
End: 2025-09-18
Payer: MEDICARE

## 2025-09-18 DIAGNOSIS — S46.012D STRAIN OF MUSCLE(S) AND TENDON(S) OF THE ROTATOR CUFF OF LEFT SHOULDER, SUBSEQUENT ENCOUNTER: ICD-10-CM

## 2025-09-18 DIAGNOSIS — M47.812 SPONDYLOSIS W/OUT MYELOPATHY OR RADICULOPATHY, CERVICAL REGION: ICD-10-CM

## 2025-09-18 PROCEDURE — 99214 OFFICE O/P EST MOD 30 MIN: CPT

## (undated) DEVICE — VENODYNE/SCD SLEEVE CALF MEDIUM

## (undated) DEVICE — DRSG KLING 4"

## (undated) DEVICE — DRAPE TOWEL BLUE 17" X 24"

## (undated) DEVICE — SUT MONOSOF 5-0 18" P-12

## (undated) DEVICE — WARMING BLANKET FULL ADULT

## (undated) DEVICE — GLV 7.5 PROTEXIS (BLUE)

## (undated) DEVICE — DRAPE 3/4 SHEET 52X76"

## (undated) DEVICE — SOL IRR POUR NS 0.9% 500ML

## (undated) DEVICE — POSITIONER FOAM HEAD CRADLE (PINK)

## (undated) DEVICE — SPECIMEN CONTAINER PET

## (undated) DEVICE — TOURNIQUET CUFF 18" DUAL PORT SINGLE BLADDER W PLC  (BLACK)

## (undated) DEVICE — SUT MONOCRYL 5-0 18" P-3 UNDYED

## (undated) DEVICE — POSITIONER STRAP ARMBOARD VELCRO TS-30

## (undated) DEVICE — GLV 7 PROTEXIS (WHITE)

## (undated) DEVICE — SOL IRR POUR H2O 1500ML

## (undated) DEVICE — PACK UPPER EXTREMITY

## (undated) DEVICE — DRSG WEBRIL 3"

## (undated) DEVICE — NDL HYPO REGULAR BEVEL 25G X 1.5" (BLUE)

## (undated) DEVICE — TOURNIQUET ESMARK 4"

## (undated) DEVICE — DRSG XEROFORM 1 X 8"